# Patient Record
Sex: OTHER/UNKNOWN | URBAN - METROPOLITAN AREA
[De-identification: names, ages, dates, MRNs, and addresses within clinical notes are randomized per-mention and may not be internally consistent; named-entity substitution may affect disease eponyms.]

---

## 2024-10-21 ENCOUNTER — APPOINTMENT (OUTPATIENT)
Dept: URBAN - METROPOLITAN AREA SURGERY 9 | Age: 39
Setting detail: DERMATOLOGY
End: 2024-10-21

## 2024-10-21 DIAGNOSIS — L71.8 OTHER ROSACEA: ICD-10-CM

## 2024-10-21 PROCEDURE — 99204 OFFICE O/P NEW MOD 45 MIN: CPT

## 2024-10-21 PROCEDURE — OTHER SKIN MEDICINALS: OTHER

## 2024-10-21 PROCEDURE — OTHER COUNSELING: OTHER

## 2024-10-21 PROCEDURE — OTHER MIPS QUALITY: OTHER

## 2024-10-21 PROCEDURE — OTHER PRESCRIPTION MEDICATION MANAGEMENT: OTHER

## 2024-10-21 ASSESSMENT — LOCATION DETAILED DESCRIPTION DERM
LOCATION DETAILED: LEFT INFERIOR CENTRAL MALAR CHEEK
LOCATION DETAILED: RIGHT INFERIOR CENTRAL MALAR CHEEK

## 2024-10-21 ASSESSMENT — LOCATION SIMPLE DESCRIPTION DERM
LOCATION SIMPLE: RIGHT CHEEK
LOCATION SIMPLE: LEFT CHEEK

## 2024-10-21 ASSESSMENT — LOCATION ZONE DERM: LOCATION ZONE: FACE

## 2024-10-21 NOTE — PROCEDURE: PRESCRIPTION MEDICATION MANAGEMENT
Plan: Discussed topicals vs topicals plus PO antibiotics. She elected for starting with topicals.\\n\\nStart Rosacea Triple Cream daily to BID prn from [facility] and prescription sent (metrocream was $43 with insurance and Soolantra was >$150 with insurance).\\n\\nIf not controlled with topicals alone, can add doxycycline or spironolactone. Can also consider referral for laser in the future depending on response as she had it done prior to moving to the  and is interested in potentially restarting
Detail Level: Zone
Render In Strict Bullet Format?: No

## 2024-10-21 NOTE — PROCEDURE: [facility]
Sig: Apply a thin layer to the itching areas twice daily as needed
Sig: Apply a thin layer to the scar daily
Sig: Apply a thin layer to the affected areas twice daily
Sig: Apply a thin layer to the affected skin twice daily
Sig: Wash affected areas daily.
Sig: Take one twice daily
Sig: Apply to affected areas twice daily
Sig: Apply pea sized amount per area at night
Sig: Apply twice daily as instructed
Sig: Apply twice daily for 5 days
Sig: Apply a thin layer to the affected areas daily
Sig: Take one pill daily
Sig: Take one pill twice daily
Detail Level: Simple
Sig: Apply to the affected skin twice daily
Sig: Apply to affected areas on face twice daily
Sig: Apply a thin layer to the areas with decreased hair density 1-2 times daily
Intro Statement: I recommended the following products:
Sig: Apply nightly to warts nightly under occlusion
Product Type (1): Rosacea
Sig: Use as directed when washing hair
Sig: Apply a thin layer to the affected nails daily
Rosacea Medicines Prescription 1: Azelaic Acid 15%, Ivermectin 1%, Metronidazole 1% Cream
Sig: Apply a thin layer to the affected skin qhs
Sig: Apply nightly to affected areas nightly under occlusion
Sig: apply to affected area qhs

## 2024-10-21 NOTE — HPI: RASH (ROSACEA)
Is This A New Presentation, Or A Follow-Up?: Rosacea
Additional History: Previously treat with topicals (possibly Soolantra) and laser prior to moving to the [place]. She denies eye symptoms.
- - -